# Patient Record
Sex: MALE | Race: WHITE | NOT HISPANIC OR LATINO | Employment: OTHER | URBAN - METROPOLITAN AREA
[De-identification: names, ages, dates, MRNs, and addresses within clinical notes are randomized per-mention and may not be internally consistent; named-entity substitution may affect disease eponyms.]

---

## 2017-08-03 ENCOUNTER — TELEPHONE (OUTPATIENT)
Dept: MEDICAL GROUP | Facility: MEDICAL CENTER | Age: 72
End: 2017-08-03

## 2017-08-03 DIAGNOSIS — E78.5 OTHER AND UNSPECIFIED HYPERLIPIDEMIA: ICD-10-CM

## 2017-08-03 NOTE — TELEPHONE ENCOUNTER
1. Caller Name: Mahendra Michele Sutton                                           Call Back Number: [phone]        Patient approves a detailed voicemail message: N\A    Patient wanted to know if you would place labs for his up coming appointment with you on 8/11? Please advise

## 2017-08-09 ENCOUNTER — HOSPITAL ENCOUNTER (OUTPATIENT)
Dept: LAB | Facility: MEDICAL CENTER | Age: 72
End: 2017-08-09
Attending: INTERNAL MEDICINE
Payer: MEDICARE

## 2017-08-09 DIAGNOSIS — E78.5 OTHER AND UNSPECIFIED HYPERLIPIDEMIA: ICD-10-CM

## 2017-08-09 LAB
CHOLEST SERPL-MCNC: 171 MG/DL (ref 100–199)
HDLC SERPL-MCNC: 48 MG/DL
LDLC SERPL CALC-MCNC: 104 MG/DL
TRIGL SERPL-MCNC: 93 MG/DL (ref 0–149)

## 2017-08-09 PROCEDURE — 80061 LIPID PANEL: CPT

## 2017-08-09 PROCEDURE — 36415 COLL VENOUS BLD VENIPUNCTURE: CPT

## 2017-08-11 ENCOUNTER — OFFICE VISIT (OUTPATIENT)
Dept: MEDICAL GROUP | Facility: MEDICAL CENTER | Age: 72
End: 2017-08-11
Payer: MEDICARE

## 2017-08-11 VITALS
BODY MASS INDEX: 28.97 KG/M2 | TEMPERATURE: 98.7 F | DIASTOLIC BLOOD PRESSURE: 80 MMHG | WEIGHT: 191.14 LBS | HEIGHT: 68 IN | SYSTOLIC BLOOD PRESSURE: 110 MMHG | OXYGEN SATURATION: 94 % | HEART RATE: 69 BPM | RESPIRATION RATE: 14 BRPM

## 2017-08-11 DIAGNOSIS — E66.3 OVERWEIGHT (BMI 25.0-29.9): ICD-10-CM

## 2017-08-11 DIAGNOSIS — R25.1 TREMOR: ICD-10-CM

## 2017-08-11 DIAGNOSIS — E78.5 HYPERLIPIDEMIA, UNSPECIFIED HYPERLIPIDEMIA TYPE: ICD-10-CM

## 2017-08-11 DIAGNOSIS — R42 LIGHTHEADED: ICD-10-CM

## 2017-08-11 DIAGNOSIS — R60.0 LOCALIZED EDEMA: ICD-10-CM

## 2017-08-11 PROCEDURE — 99214 OFFICE O/P EST MOD 30 MIN: CPT | Performed by: INTERNAL MEDICINE

## 2017-08-11 RX ORDER — COVID-19 ANTIGEN TEST
KIT MISCELLANEOUS
COMMUNITY

## 2017-08-11 ASSESSMENT — PATIENT HEALTH QUESTIONNAIRE - PHQ9: CLINICAL INTERPRETATION OF PHQ2 SCORE: 0

## 2017-08-11 NOTE — ASSESSMENT & PLAN NOTE
He reports that especially when he stands up very quickly that he feels lightheaded for a very short period of time. Some days this is worse than others. He is not aware of aggravating or alleviating factors otherwise.

## 2017-08-11 NOTE — ASSESSMENT & PLAN NOTE
He remains overweight with BMI 29.06. Actually this is quite a bit better than he was before he had his gallbladder. He is not exercising it is not careful about his diet currently.

## 2017-08-11 NOTE — ASSESSMENT & PLAN NOTE
He has noticed a small amount of edema and especially his left lower leg. He is not careful about avoiding salt.

## 2017-08-11 NOTE — ASSESSMENT & PLAN NOTE
He has hyperlipidemia. Most recent lipid panel shows cholesterol 171, triglycerides 93, HDL 48, . He is not careful about his diet. He is overweight.

## 2017-08-11 NOTE — MR AVS SNAPSHOT
"        Mahendra Lewis   2017 1:40 PM   Office Visit   MRN: [id number]    Department:  [address] [place] [address]   Dept Phone:  [phone]    Description:  Male : 1945   Provider:  Perico Jones M.D.           Reason for Visit     Other is starting to notice symptoms of Parkinsons diease and wants to discuss.      Allergies as of 2017     Allergen Noted Reactions    Sulfa Drugs 2012       From childhood      You were diagnosed with     Lightheaded   [906120]       Tremor   [054867]         Vital Signs     Blood Pressure Pulse Temperature Respirations Height Weight    110/80 mmHg 69 37.1 °C (98.7 °F) 14 1.727 m (5' 8\") 86.7 kg (191 lb 2.2 oz)    Body Mass Index Oxygen Saturation Smoking Status             29.07 kg/m2 94% Never Smoker          Basic Information     Date Of Birth Sex Race Ethnicity Preferred Language    1945 Male White Non- English      Problem List              ICD-10-CM Priority Class Noted - Resolved    Other and unspecified hyperlipidemia E78.5   2015 - Present    Edema R60.9   2015 - Present      Health Maintenance        Date Due Completion Dates    IMM DTaP/Tdap/Td Vaccine (1 - Tdap) 1965 ---    COLONOSCOPY 1996 ---    IMM ZOSTER VACCINE 2006 ---    IMM PNEUMOCOCCAL 65+ (ADULT) LOW/MEDIUM RISK SERIES (1 of 2 - PCV13) 2011 ---    IMM INFLUENZA (1) 2017 ---            Current Immunizations     No immunizations on file.      Below and/or attached are the medications your provider expects you to take. Review all of your home medications and newly ordered medications with your provider and/or pharmacist. Follow medication instructions as directed by your provider and/or pharmacist. Please keep your medication list with you and share with your provider. Update the information when medications are discontinued, doses are changed, or new medications (including over-the-counter products) are added; and carry medication information at " all times in the event of emergency situations     Allergies:  SULFA DRUGS - (reactions not documented)               Medications  Valid as of: August 11, 2017 -  2:16 PM    Generic Name Brand Name Tablet Size Instructions for use    Metoprolol Tartrate (Tab) LOPRESSOR 25 MG Take 1 Tab by mouth 2 times a day.        Multiple Vitamins-Minerals (Tab) THERAGRAN-M  Take 1 Tab by mouth every day.        Naproxen Sodium (Cap) Naproxen Sodium 220 MG Take  by mouth.        non-formulary med non-formulary med  Take  by mouth every day. Indications: Herbal over counter testosteron        Omega-3 Fatty Acids (Cap) OMEGA 3 FA 1000 MG Take 1,000 mg by mouth every day.        .                 Medicines prescribed today were sent to:     [id number] - [place], [place] - [address] [address]    Phone: [phone] Fax: [phone]    Open 24 Hours?: No      Medication refill instructions:       If your prescription bottle indicates you have medication refills left, it is not necessary to call your provider’s office. Please contact your pharmacy and they will refill your medication.    If your prescription bottle indicates you do not have any refills left, you may request refills at any time through one of the following ways: The online [employer] system (except Urgent Care), by calling your provider’s office, or by asking your pharmacy to contact your provider’s office with a refill request. Medication refills are processed only during regular business hours and may not be available until the next business day. Your provider may request additional information or to have a follow-up visit with you prior to refilling your medication.   *Please Note: Medication refills are assigned a new Rx number when refilled electronically. Your pharmacy may indicate that no refills were authorized even though a new prescription for the same medication is available at the pharmacy. Please request the medicine  by name with the pharmacy before contacting your provider for a refill.           MyChart Access Code: Activation code not generated  Current MyChart Status: Active

## 2017-08-11 NOTE — ASSESSMENT & PLAN NOTE
He has noticed a tremor especially in his hands. He is not aware of aggravating or alleviating factors. He is not sure whether it occurs more with intention or at rest. He is not aware of any family history of idiopathic tremor or Parkinson's disease.

## 2017-08-12 ENCOUNTER — HOSPITAL ENCOUNTER (EMERGENCY)
Facility: MEDICAL CENTER | Age: 72
End: 2017-08-12
Attending: EMERGENCY MEDICINE
Payer: MEDICARE

## 2017-08-12 ENCOUNTER — APPOINTMENT (OUTPATIENT)
Dept: RADIOLOGY | Facility: MEDICAL CENTER | Age: 72
End: 2017-08-12
Attending: EMERGENCY MEDICINE
Payer: MEDICARE

## 2017-08-12 VITALS
HEIGHT: 68 IN | TEMPERATURE: 98 F | BODY MASS INDEX: 29.07 KG/M2 | HEART RATE: 63 BPM | RESPIRATION RATE: 16 BRPM | WEIGHT: 191.8 LBS | SYSTOLIC BLOOD PRESSURE: 139 MMHG | DIASTOLIC BLOOD PRESSURE: 85 MMHG | OXYGEN SATURATION: 98 %

## 2017-08-12 DIAGNOSIS — M25.512 ACUTE PAIN OF LEFT SHOULDER: ICD-10-CM

## 2017-08-12 PROCEDURE — 73030 X-RAY EXAM OF SHOULDER: CPT | Mod: LT

## 2017-08-12 PROCEDURE — A9270 NON-COVERED ITEM OR SERVICE: HCPCS | Performed by: EMERGENCY MEDICINE

## 2017-08-12 PROCEDURE — 700102 HCHG RX REV CODE 250 W/ 637 OVERRIDE(OP): Performed by: EMERGENCY MEDICINE

## 2017-08-12 PROCEDURE — 99284 EMERGENCY DEPT VISIT MOD MDM: CPT

## 2017-08-12 RX ORDER — HYDROCODONE BITARTRATE AND ACETAMINOPHEN 5; 325 MG/1; MG/1
1-2 TABLET ORAL EVERY 4 HOURS PRN
Qty: 20 TAB | Refills: 0 | Status: SHIPPED | OUTPATIENT
Start: 2017-08-12 | End: 2017-11-22

## 2017-08-12 RX ORDER — IBUPROFEN 600 MG/1
600 TABLET ORAL EVERY 8 HOURS PRN
Qty: 40 TAB | Refills: 0 | Status: SHIPPED | OUTPATIENT
Start: 2017-08-12 | End: 2017-11-22

## 2017-08-12 RX ORDER — HYDROCODONE BITARTRATE AND ACETAMINOPHEN 5; 325 MG/1; MG/1
1 TABLET ORAL ONCE
Status: COMPLETED | OUTPATIENT
Start: 2017-08-12 | End: 2017-08-12

## 2017-08-12 RX ORDER — IBUPROFEN 600 MG/1
600 TABLET ORAL ONCE
Status: COMPLETED | OUTPATIENT
Start: 2017-08-12 | End: 2017-08-12

## 2017-08-12 RX ADMIN — HYDROCODONE BITARTRATE AND ACETAMINOPHEN 1 TABLET: 5; 325 TABLET ORAL at 21:56

## 2017-08-12 RX ADMIN — IBUPROFEN 600 MG: 600 TABLET ORAL at 21:56

## 2017-08-12 ASSESSMENT — PAIN SCALES - GENERAL
PAINLEVEL_OUTOF10: 0
PAINLEVEL_OUTOF10: 6

## 2017-08-12 NOTE — ED AVS SNAPSHOT
Home Care Instructions                                                                                                                Mahendra Lewis   MRN: [id number]    Department:  [facility], Emergency Dept   Date of Visit:  8/12/2017            [facility], Emergency Dept    [address]    [place] [address]    Phone:  [phone]      You were seen by     Sabine Marquez M.D.      Your Diagnosis Was     Acute pain of left shoulder     M25.512       Follow-up Information     1. Call Jared Maravilla M.D..    Specialty:  Orthopaedics    Why:  Monday for further evaluation and treatment    Contact information    [address]  [address]  [place] [address]  [phone]        Medication Information     Review all of your home medications and newly ordered medications with your primary doctor and/or pharmacist as soon as possible. Follow medication instructions as directed by your doctor and/or pharmacist.     Please keep your complete medication list with you and share with your physician. Update the information when medications are discontinued, doses are changed, or new medications (including over-the-counter products) are added; and carry medication information at all times in the event of emergency situations.               Medication List      START taking these medications        Instructions    Morning Afternoon Evening Bedtime    hydrocodone-acetaminophen 5-325 MG Tabs per tablet   Commonly known as:  NORCO        Take 1-2 Tabs by mouth every four hours as needed.   Dose:  1-2 Tab                        ibuprofen 600 MG Tabs   Commonly known as:  MOTRIN        Take 1 Tab by mouth every 8 hours as needed for Moderate Pain.   Dose:  600 mg                          ASK your doctor about these medications        Instructions    Morning Afternoon Evening Bedtime    ALEVE 220 MG Caps   Generic drug:  Naproxen Sodium        Take  by mouth.                       metoprolol 25 MG Tabs   Commonly known as:  LOPRESSOR        Take 1 Tab by mouth 2 times a day.   Dose:  25 mg                        non-formulary med        Take  by mouth every day. Indications: Herbal over counter testosteron                        therapeutic multivitamin-minerals Tabs        Take 1 Tab by mouth every day.   Dose:  1 Tab                             Where to Get Your Medications      These medications were sent to [id number] - [place] [address] [place] [address]     Phone:  [phone]    - ibuprofen 600 MG Tabs      You can get these medications from any pharmacy     Bring a paper prescription for each of these medications    - hydrocodone-acetaminophen 5-325 MG Tabs per tablet            Procedures and tests performed during your visit     DX-SHOULDER 2+ LEFT    SLING        Discharge Instructions       Shoulder Pain  The shoulder is the joint that connects your arm to your body. Muscles and band-like tissues that connect bones to muscles (tendons) hold the joint together. Shoulder pain is felt if an injury or medical problem affects one or more parts of the shoulder.  HOME CARE   · Put ice on the sore area.  ¨ Put ice in a plastic bag.  ¨ Place a towel between your skin and the bag.  ¨ Leave the ice on for 15-20 minutes, 03-04 times a day for the first 2 days.  · Stop using cold packs if they do not help with the pain.  · If you were given something to keep your shoulder from moving (sling; shoulder immobilizer), wear it as told. Only take it off to shower or bathe.  · Move your arm as little as possible, but keep your hand moving to prevent puffiness (swelling).  · Squeeze a soft ball or foam pad as much as possible to help prevent swelling.  · Take medicine as told by your doctor.  GET HELP IF:  · You have progressing new pain in your arm, hand, or fingers.  · Your hand or fingers get cold.  · Your medicine does not help lessen  your pain.  GET HELP RIGHT AWAY IF:   · Your arm, hand, or fingers are numb or tingling.  · Your arm, hand, or fingers are puffy (swollen), painful, or turn white or blue.  MAKE SURE YOU:   · Understand these instructions.  · Will watch your condition.  · Will get help right away if you are not doing well or get worse.     This information is not intended to replace advice given to you by your health care provider. Make sure you discuss any questions you have with your health care provider.     Document Released: 06/05/2009 Document Revised: 01/08/2016 Document Reviewed: 04/11/2016  [employer] Interactive Patient Education ©2016 [employer]            Patient Information     Patient Information    Following emergency treatment: all patient requiring follow-up care must return either to a private physician or a clinic if your condition worsens before you are able to obtain further medical attention, please return to the emergency room.     Billing Information    At [facility], we work to make the billing process streamlined for our patients.  Our Representatives are here to answer any questions you may have regarding your hospital bill.  If you have insurance coverage and have supplied your insurance information to us, we will submit a claim to your insurer on your behalf.  Should you have any questions regarding your bill, we can be reached online or by phone as follows:  Online: You are able pay your bills online or live chat with our representatives about any billing questions you may have. We are here to help Monday - Friday from 8:00am to 7:30pm and 9:00am - 12:00pm on Saturdays.  Please visit [email or web address]  for more information.   Phone:  [phone] or [phone]    Please note that your emergency physician, surgeon, pathologist, radiologist, anesthesiologist, and other specialists are not employed by [facility] and will therefore bill separately for their services.   Please contact them directly for any questions concerning their bills at the numbers below:     Emergency Physician Services:  [phone]  [facility]:  [phone]  [facility]:  [phone]  [facility]:  [phone]    1. Your final bill may vary from the amount quoted upon discharge if all procedures are not complete at that time, or if your doctor has additional procedures of which we are not aware. You will receive an additional bill if you return to the Emergency Department at [facility] for suture removal regardless of the facility of which the sutures were placed.     2. Please arrange for settlement of this account at the emergency registration.    3. All self-pay accounts are due in full at the time of treatment.  If you are unable to meet this obligation then payment is expected within 4-5 days.     4. If you have had radiology studies (CT, X-ray, Ultrasound, MRI), you have received a preliminary result during your emergency department visit. Please contact the radiology department [phone] to receive a copy of your final result. Please discuss the Final result with your primary physician or with the follow up physician provided.     Crisis Hotline:  [place] Crisis Hotline:  1-414-SWSSRCI or 1-306.820.7324  [place] Crisis Hotline:    1-463.282.4782 or 792-691-0355         ED Discharge Follow Up Questions    1. In order to provide you with very good care, we would like to follow up with a phone call in the next few days.  May we have your permission to contact you?     YES /  NO    2. What is the best phone number to call you? (       )_____-__________    3. What is the best time to call you?      Morning  /  Afternoon  /  Evening                   Patient Signature:  ____________________________________________________________    Date:  ____________________________________________________________

## 2017-08-12 NOTE — ED AVS SNAPSHOT
8/12/2017    Mahendra Lewis  [address] [place] [address]    Dear Mahendra:    [facility] wants to ensure your discharge home is safe and you or your loved ones have had all of your questions answered regarding your care after you leave the hospital.    Below is a list of resources and contact information should you have any questions regarding your hospital stay, follow-up instructions, or active medical symptoms.    Questions or Concerns Regarding… Contact   Medical Questions Related to Your Discharge  (7 days a week, 8am-5pm) Contact a Nurse Care Coordinator   [phone]   Medical Questions Not Related to Your Discharge  (24 hours a day / 7 days a week)  Contact the Nurse Health Line   [phone]    Medications or Discharge Instructions Refer to your discharge packet   or contact your [facility] Primary Care Provider   [phone]   Follow-up Appointment(s) Schedule your appointment via Sc[employer]   or contact Scheduling [phone]   Billing Review your statement via [employer]  or contact Billing [phone]   Medical Records Review your records via [employer]   or contact Medical Records [phone]     You may receive a telephone call within two days of discharge. This call is to make certain you understand your discharge instructions and have the opportunity to have any questions answered. You can also easily access your medical information, test results and upcoming appointments via the [employer]oopshot free online health management tool. You can learn more and sign up at [email or web address]. For assistance setting up your Sc[employer]shot account, please call [phone].    Once again, we want to ensure your discharge home is safe and that you have a clear understanding of any next steps in your care. If you have any questions or concerns, please do not hesitate to contact us, we are here for you. Thank you for choosing [facility] for your healthcare needs.    Sincerely,    Your [facility] Healthcare Team

## 2017-08-12 NOTE — PROGRESS NOTES
Subjective:     Chief Complaint   Patient presents with   • Other     is starting to notice symptoms of Parkinsons diease and wants to discuss.     Mahendra Lewis is a 71 y.o. male here today for follow-up of his various health problems and evaluation of a tremor.    Edema  He has noticed a small amount of edema and especially his left lower leg. He is not careful about avoiding salt.    Hyperlipidemia  He has hyperlipidemia. Most recent lipid panel shows cholesterol 171, triglycerides 93, HDL 48, . He is not careful about his diet. He is overweight.    Lightheaded  He reports that especially when he stands up very quickly that he feels lightheaded for a very short period of time. Some days this is worse than others. He is not aware of aggravating or alleviating factors otherwise.    Tremor  He has noticed a tremor especially in his hands. He is not aware of aggravating or alleviating factors. He is not sure whether it occurs more with intention or at rest. He is not aware of any family history of idiopathic tremor or Parkinson's disease.    Overweight (BMI 25.0-29.9)  He remains overweight with BMI 29.06. Actually this is quite a bit better than he was before he had his gallbladder out. He is not exercising it is not careful about his diet currently.       Diagnoses of Lightheaded, Tremor, Hyperlipidemia, unspecified hyperlipidemia type, Localized edema, and Overweight (BMI 25.0-29.9) were pertinent to this visit.    Allergies: Sulfa drugs  Current medicines (including changes today)  Current Outpatient Prescriptions   Medication Sig Dispense Refill   • Naproxen Sodium (ALEVE) 220 MG Cap Take  by mouth.     • metoprolol (LOPRESSOR) 25 MG Tab Take 1 Tab by mouth 2 times a day. 60 Tab 11   • therapeutic multivitamin-minerals (THERAGRAN-M) TABS Take 1 Tab by mouth every day.     • non-formulary med Take  by mouth every day. Indications: Herbal over counter testosteron     • docosahexanoic acid (OMEGA 3 FA)  "1000 MG CAPS Take 1,000 mg by mouth every day.       No current facility-administered medications for this visit.       He  has a past medical history of Cancer (CMS-[facility]); Dental disorder; Other and unspecified hyperlipidemia (7/8/2015); Edema (7/8/2015); and Overweight (BMI 25.0-29.9) (8/11/2017).  Health Maintenance: He will get more complete wellness evaluation in the near future.  ROS    Patient denies significant change in strength, weight or appetite.  No significant lightheadedness or headaches.  No change in vision, hearing, or swallowing.  No new dyspnea, coughing, chest pain, or palpitations.  No indigestion, abdominal pain, or change in bowel habits.  No change in urinating.  No new ankle swelling.       Objective:     PE:  /80 mmHg  Pulse 69  Temp(Src) 37.1 °C (98.7 °F)  Resp 14  Ht 1.727 m (5' 8\")  Wt 86.7 kg (191 lb 2.2 oz)  BMI 29.07 kg/m2  SpO2 94%   Neck is supple without significant lymphadenopathy or masses.  Lungs are clear with normal breath sounds without wheezes or rales .  Cardiovascular: peripheral circulation is satisfactory, heart sounds are unchanged and unremarkable.  Abdomen is soft, without masses or tenderness, with normal bowel sounds.  Extremities are without significant edema, cyanosis or deformity except for trace to 1+ ankle edema bilaterally.  No significant tremor today. Perhaps very minimal cogwheel rigidity. Good facial expression.      Assessment and Plan:   The following treatment plan was discussed  1. Lightheaded      He was cautioned against falling. He is encouraged to remain well hydrated.   2. Tremor      He will pay attention to aggravating and alleviating factors and when this tends to occur.   3. Hyperlipidemia, unspecified hyperlipidemia type.      Long discussion regarding appropriate diet. Continue weight loss. Exercise.   4. Localized edema      Avoid salt, elevate legs when sitting.   5. Overweight (BMI 25.0-29.9)      Continue working at weight " loss with appropriate diet and exercise.       Followup: Return in about 4 weeks (around 9/8/2017) for Long.

## 2017-08-12 NOTE — ED AVS SNAPSHOT
KitOrder Access Code: Activation code not generated  Current KitOrder Status: Active    [employer]  A secure, online tool to manage your health information     [employer] KitOrder® is a secure, online tool that connects you to your personalized health information from the privacy of your home -- day or night - making it very easy for you to manage your healthcare. Once the activation process is completed, you can even access your medical information using the KitOrder sammy, which is available for free in the Apple Sammy store or Google Play store.     KitOrder provides the following levels of access (as shown below):   My Chart Features   [facility] Primary Care Doctor [facility]  Specialists [facility]  Urgent  Care Non-[facility]  Primary Care  Doctor   Email your healthcare team securely and privately 24/7 X X X X   Manage appointments: schedule your next appointment; view details of past/upcoming appointments X      Request prescription refills. X      View recent personal medical records, including lab and immunizations X X X X   View health record, including health history, allergies, medications X X X X   Read reports about your outpatient visits, procedures, consult and ER notes X X X X   See your discharge summary, which is a recap of your hospital and/or ER visit that includes your diagnosis, lab results, and care plan. X X       How to register for KitOrder:  1. Go to  [email or web address].  2. Click on the Sign Up Now box, which takes you to the New Member Sign Up page. You will need to provide the following information:  a. Enter your KitOrder Access Code exactly as it appears at the top of this page. (You will not need to use this code after you’ve completed the sign-up process. If you do not sign up before the expiration date, you must request a new code.)   b. Enter your date of birth.   c. Enter your home email address.   d. Click Submit, and follow the next screen’s instructions.  3. Create a KitOrder ID. This will  be your Rawbots login ID and cannot be changed, so think of one that is secure and easy to remember.  4. Create a [employer]bots password. You can change your password at any time.  5. Enter your Password Reset Question and Answer. This can be used at a later time if you forget your password.   6. Enter your e-mail address. This allows you to receive e-mail notifications when new information is available in Rawbots.  7. Click Sign Up. You can now view your health information.    For assistance activating your [employer]bots account, call [phone]

## 2017-08-13 NOTE — ED NOTES
Discharge information provided. Pt verbalized understanding of discharge instructions to follow up with Ortho and to return to ER if condition worsens. Pt expressed the awareness of not driving or operating heavy machinery, has ride home with Daughter. Pt ambulated out of ER in a steady gait, no additional questions or concerns. Sling in place, paper scripts given, educated on new medications.

## 2017-08-13 NOTE — DISCHARGE INSTRUCTIONS
Shoulder Pain  The shoulder is the joint that connects your arm to your body. Muscles and band-like tissues that connect bones to muscles (tendons) hold the joint together. Shoulder pain is felt if an injury or medical problem affects one or more parts of the shoulder.  HOME CARE   · Put ice on the sore area.  ¨ Put ice in a plastic bag.  ¨ Place a towel between your skin and the bag.  ¨ Leave the ice on for 15-20 minutes, 03-04 times a day for the first 2 days.  · Stop using cold packs if they do not help with the pain.  · If you were given something to keep your shoulder from moving (sling; shoulder immobilizer), wear it as told. Only take it off to shower or bathe.  · Move your arm as little as possible, but keep your hand moving to prevent puffiness (swelling).  · Squeeze a soft ball or foam pad as much as possible to help prevent swelling.  · Take medicine as told by your doctor.  GET HELP IF:  · You have progressing new pain in your arm, hand, or fingers.  · Your hand or fingers get cold.  · Your medicine does not help lessen your pain.  GET HELP RIGHT AWAY IF:   · Your arm, hand, or fingers are numb or tingling.  · Your arm, hand, or fingers are puffy (swollen), painful, or turn white or blue.  MAKE SURE YOU:   · Understand these instructions.  · Will watch your condition.  · Will get help right away if you are not doing well or get worse.     This information is not intended to replace advice given to you by your health care provider. Make sure you discuss any questions you have with your health care provider.     Document Released: 06/05/2009 Document Revised: 01/08/2016 Document Reviewed: 04/11/2016  [employer] Patient Education ©2016 [employer]

## 2017-08-13 NOTE — ED NOTES
Pt injured shoulder at 1800 while moving tonight. Severe pain when lifting arm upwards, CMS intact. Call light within reach, will cont to monitor.

## 2017-08-13 NOTE — ED PROVIDER NOTES
ED Provider Note    CHIEF COMPLAINT  Chief Complaint   Patient presents with   • Shoulder Pain       HPI  Mahendra Lewis is a 71 y.o. male who presents after trying to place a very heavy and we'll be bagged closed on the floor with his nondominant left arm, he felt a painful pop in the front of his left shoulder. He did not actually fall     REVIEW OF SYSTEMS  See HPI for further details.     PAST MEDICAL HISTORY  Past Medical History   Diagnosis Date   • Cancer (CMS-HCC)      squamous cell on skin   • Dental disorder      dental implants   • Other and unspecified hyperlipidemia 7/8/2015   • Edema 7/8/2015   • Overweight (BMI 25.0-29.9) 8/11/2017       FAMILY HISTORY  Family History   Problem Relation Age of Onset   • Heart Attack Father    • Cancer Brother      brain tumor       SOCIAL HISTORY  Social History     Social History   • Marital Status:      Spouse Name: N/A   • Number of Children: N/A   • Years of Education: N/A     Social History Main Topics   • Smoking status: Never Smoker    • Smokeless tobacco: Never Used   • Alcohol Use: Yes      Comment: Rarely   • Drug Use: No   • Sexual Activity: Not Asked     Other Topics Concern   • None     Social History Narrative       SURGICAL HISTORY  Past Surgical History   Procedure Laterality Date   • Hernia repair     • Tonsillectomy     • Other       squamous cell ca removed   • Janet by laparoscopy  1/20/2012     Performed by MARAH TORRES at [facility]       CURRENT MEDICATIONS  Home Medications     Reviewed by Carlos Ferro R.N. (Registered Nurse) on 08/12/17 at 1932  Med List Status: Complete    Medication Last Dose Status    metoprolol (LOPRESSOR) 25 MG Tab Not taking Active    Naproxen Sodium (ALEVE) 220 MG Cap  Active    non-formulary med  Active    therapeutic multivitamin-minerals (THERAGRAN-M) TABS 8/12/2017 Active                ALLERGIES  Allergies   Allergen Reactions   • Sulfa Drugs      From childhood       PHYSICAL  "EXAM  VITAL SIGNS: /90 mmHg  Pulse 73  Temp(Src) 36.6 °C (97.8 °F)  Resp 20  Ht 1.727 m (5' 7.99\")  Wt 87 kg (191 lb 12.8 oz)  BMI 29.17 kg/m2  SpO2 98% @BRANT[200176::@   Constitutional: Well developed, Well nourished, No acute respiratory distress, Non-toxic appearance.   HENT: Normocephalic, Atraumatic, Bilateral external ears normal, Oropharynx clear, mucous membranes are moist.  Eyes: Conjunctiva normal, No discharge. No icterus.  Neck: Normal range of motion. Supple.  Skin: Warm, Dry, No erythema, No rash. Intact. Multiple tattoos   Musculoskeletal: No specific sent tenderness to the left shoulder girdle. Mild tenderness over the long biceps tendon. Patient has good strength with humerus extension, abduction, external rotation, but he has pain and reflexive drop attempting forward flexion and internal rotation as well as elbow flexion.  Neurologic: Alert & oriented x 3. No focal deficits noted.  Sensation is intact left deltoid and hand  Cardiac 2+ left radial pulse    DX-SHOULDER 2+ LEFT   Final Result      1.  No radiographic evidence of acute traumatic injury.   2.  Degenerative change of the acromioclavicular joint.              COURSE & MEDICAL DECISION MAKING  Pertinent Labs & Imaging studies reviewed. (See chart for details)  Based on the patient's presentation, he may have avulsed the short head of his biceps or subscapularis. He is put into a sling, and he was given Motrin and Norco here in the emergency department. He is comfortable following up with orthopedics next week.    I reviewed prescription monitoring program for patient's narcotic use before prescribing a scheduled drug.The patient will not drink alcohol nor drive with prescribed medications. The patient will return for new or worsening symptoms and is stable at the time of discharge.        DISPOSITION:  Patient will be discharged home in stable condition.    FOLLOW UP:  Jared Maravilla M.D.  [address]  [address]  [place] [address]  [phone]    Call  Monday for further evaluation and treatment      OUTPATIENT MEDICATIONS:  New Prescriptions    HYDROCODONE-ACETAMINOPHEN (NORCO) 5-325 MG TAB PER TABLET    Take 1-2 Tabs by mouth every four hours as needed.    IBUPROFEN (MOTRIN) 600 MG TAB    Take 1 Tab by mouth every 8 hours as needed for Moderate Pain.           FINAL IMPRESSION  1. Acute pain of left shoulder               Electronically signed by: Sabine Marquez, 8/12/2017 9:27 PM

## 2017-11-22 ENCOUNTER — APPOINTMENT (OUTPATIENT)
Dept: ADMISSIONS | Facility: MEDICAL CENTER | Age: 72
End: 2017-11-22
Attending: ORTHOPAEDIC SURGERY
Payer: MEDICARE

## 2017-11-30 ENCOUNTER — HOSPITAL ENCOUNTER (OUTPATIENT)
Facility: MEDICAL CENTER | Age: 72
End: 2017-11-30
Attending: ORTHOPAEDIC SURGERY | Admitting: ORTHOPAEDIC SURGERY
Payer: MEDICARE

## 2017-11-30 VITALS
SYSTOLIC BLOOD PRESSURE: 113 MMHG | RESPIRATION RATE: 14 BRPM | DIASTOLIC BLOOD PRESSURE: 93 MMHG | HEART RATE: 64 BPM | BODY MASS INDEX: 28.73 KG/M2 | HEIGHT: 69 IN | TEMPERATURE: 96.8 F | WEIGHT: 194 LBS | OXYGEN SATURATION: 93 %

## 2017-11-30 PROCEDURE — 500028 HCHG ARTHROWAND TURBOVAC 3.5/90 SUCT.: Performed by: ORTHOPAEDIC SURGERY

## 2017-11-30 PROCEDURE — 501835 HCHG SUTURE PLASTIC: Performed by: ORTHOPAEDIC SURGERY

## 2017-11-30 PROCEDURE — 700101 HCHG RX REV CODE 250

## 2017-11-30 PROCEDURE — 700111 HCHG RX REV CODE 636 W/ 250 OVERRIDE (IP)

## 2017-11-30 PROCEDURE — 160029 HCHG SURGERY MINUTES - 1ST 30 MINS LEVEL 4: Performed by: ORTHOPAEDIC SURGERY

## 2017-11-30 PROCEDURE — 160036 HCHG PACU - EA ADDL 30 MINS PHASE I: Performed by: ORTHOPAEDIC SURGERY

## 2017-11-30 PROCEDURE — 502581 HCHG PACK, SHOULDER ARTHROSCOPY: Performed by: ORTHOPAEDIC SURGERY

## 2017-11-30 PROCEDURE — 160041 HCHG SURGERY MINUTES - EA ADDL 1 MIN LEVEL 4: Performed by: ORTHOPAEDIC SURGERY

## 2017-11-30 PROCEDURE — 160047 HCHG PACU  - EA ADDL 30 MINS PHASE II: Performed by: ORTHOPAEDIC SURGERY

## 2017-11-30 PROCEDURE — 160022 HCHG BLOCK: Performed by: ORTHOPAEDIC SURGERY

## 2017-11-30 PROCEDURE — 160048 HCHG OR STATISTICAL LEVEL 1-5: Performed by: ORTHOPAEDIC SURGERY

## 2017-11-30 PROCEDURE — 160002 HCHG RECOVERY MINUTES (STAT): Performed by: ORTHOPAEDIC SURGERY

## 2017-11-30 PROCEDURE — 160046 HCHG PACU - 1ST 60 MINS PHASE II: Performed by: ORTHOPAEDIC SURGERY

## 2017-11-30 PROCEDURE — 160009 HCHG ANES TIME/MIN: Performed by: ORTHOPAEDIC SURGERY

## 2017-11-30 PROCEDURE — 160035 HCHG PACU - 1ST 60 MINS PHASE I: Performed by: ORTHOPAEDIC SURGERY

## 2017-11-30 PROCEDURE — 160025 RECOVERY II MINUTES (STATS): Performed by: ORTHOPAEDIC SURGERY

## 2017-11-30 PROCEDURE — 500151 HCHG CANNULA, THRDED 8.4: Performed by: ORTHOPAEDIC SURGERY

## 2017-11-30 PROCEDURE — C1713 ANCHOR/SCREW BN/BN,TIS/BN: HCPCS | Performed by: ORTHOPAEDIC SURGERY

## 2017-11-30 DEVICE — SUTURE ANCHOR HEALICOIL REGENESORB 5.5MM: Type: IMPLANTABLE DEVICE | Status: FUNCTIONAL

## 2017-11-30 RX ORDER — LIDOCAINE HYDROCHLORIDE 10 MG/ML
INJECTION, SOLUTION INFILTRATION; PERINEURAL
Status: COMPLETED
Start: 2017-11-30 | End: 2017-11-30

## 2017-11-30 RX ORDER — LIDOCAINE HYDROCHLORIDE AND EPINEPHRINE 10; 10 MG/ML; UG/ML
INJECTION, SOLUTION INFILTRATION; PERINEURAL
Status: DISCONTINUED | OUTPATIENT
Start: 2017-11-30 | End: 2017-11-30 | Stop reason: HOSPADM

## 2017-11-30 RX ORDER — SODIUM CHLORIDE, SODIUM LACTATE, POTASSIUM CHLORIDE, CALCIUM CHLORIDE 600; 310; 30; 20 MG/100ML; MG/100ML; MG/100ML; MG/100ML
1000 INJECTION, SOLUTION INTRAVENOUS
Status: COMPLETED | OUTPATIENT
Start: 2017-11-30 | End: 2017-11-30

## 2017-11-30 RX ADMIN — LIDOCAINE HYDROCHLORIDE 0.2 ML: 10 INJECTION, SOLUTION INFILTRATION; PERINEURAL at 08:22

## 2017-11-30 RX ADMIN — SODIUM CHLORIDE, SODIUM LACTATE, POTASSIUM CHLORIDE, CALCIUM CHLORIDE 1000 ML: 600; 310; 30; 20 INJECTION, SOLUTION INTRAVENOUS at 08:22

## 2017-11-30 ASSESSMENT — PAIN SCALES - GENERAL
PAINLEVEL_OUTOF10: 0

## 2017-11-30 NOTE — OR NURSING
1022 To PACU from OR on [facility]. All bedside rails up for safe transfer. Sleeping with ET tube and opa. Even non labored breathing. Skin pink warm dry. LUE dressing cdi. Immobilizer, brisk cap refill, 2+ radial pulse, pink arm, ice pack. Lungs clear bilaterally.   1034 no changes.   1045 ET tube and opa removed. Spontaneous even non labored breathing. arousable and calling. Denies pain and nausea.   1102 given incentive spirometer. 1000 noted.   1105 awake, denies pain and nausea.   1115 awake and alert. Denies pain and nausea.   1122 report given to Jojo huerta.

## 2017-11-30 NOTE — OR NURSING
0755 Pt. Allergies and NPO status verified, home medications reconciled. Belongings secured. Pt. Verbalizes understanding of pain scale, expected course of stay and plan of care. Surgical site verified with pt. Pt. And family given home care instructions for discharge planning. IV access established. Sequentials placed on legs.

## 2017-11-30 NOTE — DISCHARGE INSTRUCTIONS
ACTIVITY: Rest and take it easy for the first 24 hours.  A responsible adult is recommended to remain with you during that time.  It is normal to feel sleepy.  We encourage you to not do anything that requires balance, judgment or coordination.    MILD FLU-LIKE SYMPTOMS ARE NORMAL. YOU MAY EXPERIENCE GENERALIZED MUSCLE ACHES, THROAT IRRITATION, HEADACHE AND/OR SOME NAUSEA.    FOR 24 HOURS DO NOT:  Drive, operate machinery or run household appliances.  Drink beer or alcoholic beverages.   Make important decisions or sign legal documents.    SPECIAL INSTRUCTIONS: printed instructions    DIET: To avoid nausea, slowly advance diet as tolerated, avoiding spicy or greasy foods for the first day.  Add more substantial food to your diet according to your physician's instructions.  Babies can be fed formula or breast milk as soon as they are hungry.  INCREASE FLUIDS AND FIBER TO AVOID CONSTIPATION.      FOLLOW-UP APPOINTMENT:  A follow-up appointment should be arranged with your doctor: call to schedule.    You should CALL YOUR PHYSICIAN if you develop:  Fever greater than 101 degrees F.  Pain not relieved by medication, or persistent nausea or vomiting.  Excessive bleeding (blood soaking through dressing) or unexpected drainage from the wound.  Extreme redness or swelling around the incision site, drainage of pus or foul smelling drainage.  Inability to urinate or empty your bladder within 8 hours.  Problems with breathing or chest pain.    You should call 911 if you develop problems with breathing or chest pain.If you are unable to contact your doctor or surgical center, you should go to the nearest emergency room or urgent care center.  Physician's telephone #: [phone]      If any questions arise, call your doctor.  If your doctor is not available, please feel free to call the Surgical Center at [phone].  The Center is open Monday through Friday from 7AM to 7PM.  You can also call the HEALTH HOTLINE open 08  hours/day, 7 days/week and speak to a nurse at [phone], or toll free at [phone].    A registered nurse may call you a few days after your surgery to see how you are doing after your procedure.    MEDICATIONS: Resume taking daily medication.  Take prescribed pain medication with food.  If no medication is prescribed, you may take non-aspirin pain medication if needed.  PAIN MEDICATION CAN BE VERY CONSTIPATING.  Take a stool softener or laxative such as senokot, pericolace, or milk of magnesia if needed.    Prescription given for preopertively.  Last pain medication given at n/a.    If your physician has prescribed pain medication that includes Acetaminophen (Tylenol), do not take additional Acetaminophen (Tylenol) while taking the prescribed medication.    Depression / Suicide Risk    As you are discharged from this [facility] facility, it is important to learn how to keep safe from harming yourself.    Recognize the warning signs:  · Abrupt changes in personality, positive or negative- including increase in energy   · Giving away possessions  · Change in eating patterns- significant weight changes-  positive or negative  · Change in sleeping patterns- unable to sleep or sleeping all the time   · Unwillingness or inability to communicate  · Depression  · Unusual sadness, discouragement and loneliness  · Talk of wanting to die  · Neglect of personal appearance   · Rebelliousness- reckless behavior  · Withdrawal from people/activities they love  · Confusion- inability to concentrate     If you or a loved one observes any of these behaviors or has concerns about self-harm, here's what you can do:  · Talk about it- your feelings and reasons for harming yourself  · Remove any means that you might use to hurt yourself (examples: pills, rope, extension cords, firearm)  · Get professional help from the community (Mental Health, Substance Abuse, psychological counseling)  · Do not be alone:Call your Safe  "Contact- someone whom you trust who will be there for you.  · Call your local CRISIS HOTLINE 558-4188 or 306-401-2744  · Call your local Children's Mobile Crisis Response Team [place] (567) 436-9213 or www.[employer]  · Call the toll free National Suicide Prevention Hotlines   · National Suicide Prevention Lifeline 537-141-GMRC (7218)  [employer] 800-SUICIDE (869-6903)    Peripheral Nerve Block Discharge Instructions from Same Day Surgery and Inpatient :    WWhat to Expect - Upper Extremity  · You may experience numbness and weakness in shoulder, arm and hand  on the same side as your surgery  · This is normal. For some people, this may be an unpleasant sensation. Be very careful with your numb limb  · Ask for help when you need it  Shoulder Surgery Side Effects  · In addition to numbness and weakness you may experience other symptoms  · Other nerves that are close to those nerves injected can also be affected by local anesthesia  · You may experience a hoarseness in your voice  · Your breathing may feel different  · You may also notice drooping of your eyelid, pupil constriction, and decreased sweating, on the side of your surgery  · All of these side effects are normal and will resolve when the local anesthetic wears off   Prevent Injury  · Protect the limb like a baby  · Beware of exposing your limb to extreme heat or cold or trauma  · The limb may be injured without you noticing because it is numb  · Keep the limb elevated whenever possible  · Do not sleep on the limb  · Change the position of the limb regularly  · Avoid putting pressure on your surgical limb  Pain Control  · The initial block on the day of surgery will make your extremity feel \"numb\"  · Any consecutive injection including prior to discharge from the hospital will make your extremity feel \"numb\"  · You may feel an aching or burning when the local anesthesia starts to wear off  · Take pain pills as prescribed by your " surgeon  · Call your surgeon or anesthesiologist if you do not have adequate pain control  ·

## 2017-11-30 NOTE — OR NURSING
1122 Assumed care of patient; denies pain or nausea. LUE in immobilizer with ice pack over CDI L shoulder dressing, HOB elevated at 45 degrees. +2 L radial pulse with pink/warm fingers and <3 sec cap refill. Monitoring oxygen for transfer to stage II.   1135 Instructed to DB/C; demonstrated understanding.   1150 No changes. Meets criteria for transfer to stage II.   1240 D/Gabriel to care of family post uneventful stay in PACU 2.

## 2017-11-30 NOTE — OP REPORT
DATE OF SERVICE:  11/30/2017    SURGEON:  West Arora MD    ASSISTANT:  Kathryn Amezquita PA-C    ANESTHESIA:  General anesthesia with single shot interscalene block.    PREOPERATIVE DIAGNOSES:  1.  Left shoulder full-thickness acute traumatic rotator cuff tear.  2.  Left shoulder subacromial impingement bursitis, left shoulder synovitis.  3.  Left shoulder labrum/superior labral anterior posterior tear.  4.  Left shoulder long head of the biceps tendon rupture.    POSTOPERATIVE DIAGNOSES:  1.  Left shoulder full-thickness acute traumatic rotator cuff tear.  2.  Left shoulder subacromial impingement bursitis, left shoulder synovitis.  3.  Left shoulder labrum/superior labral anterior posterior tear.  4.  Left shoulder long head of the biceps tendon rupture.    PROCEDURE PERFORMED:  1.  Left shoulder arthroscopy.  2.  Left shoulder labral debridement.  3.  Left shoulder synovectomy.  4.  Left shoulder subacromial decompression.  5.  Left shoulder large rotator cuff tear repair.    INDICATIONS:  Treat left shoulder injury, improve pain, improve function.    IMPLANTS:  Finney and Nephew 5.5 mm triple loaded Healicoil anchor x3.    HISTORY OF PRESENT ILLNESS:  The patient is a very pleasant and active   72-year-old male who experienced an injury to his left shoulder approximately   2 months ago.  He immediately had significant pain and weakness.  An MRI   demonstrated a large traumatic full-thickness rotator cuff tear.  As a result,   we discussed surgical intervention.  The patient has been n.p.o. since   midnight.  He is medically cleared for surgery by the anesthesia team.    INFORMED CONSENT:  The patient informed of the risks, benefits, and   alternatives to planned operation.  The risks include but not limited to   bleeding, infection, neurovascular damage, recurrent rotator cuff tear,   stiffness, osteoarthritis/cartilage damage, DVT, PE, MI, stroke, and death.    Advanced directives were reviewed.  After  answering all questions, the patient   elected to proceed with planned operation and an informed consent form was   signed.    DESCRIPTION OF PROCEDURE:  The patient was identified in the preoperative   holding area.  The correct procedural site was identified and marked.  The   patient was then brought to the operating room and transferred to the   operating room table.  He received a single shot interscalene block by the   anesthesia team followed by general anesthesia.  Examination of his left shoulder demonstrated an obvious jairon deformity of   the upper arm.  There were no overlying skin lesions, abrasions, or   lacerations.  Passive forward flexion, abduction was to 160.  With the arm at   the side, passive external rotation was to 45 degrees.  With the arm in 90   degrees of abduction, passive external rotation was at 90 degrees and passive   internal rotation was at 25 degrees.    The patient was then carefully placed in the beach chair position with all   bony prominences being well padded.  The left shoulder was then cleaned with   several alcohol-soaked gauzes and the subacromial space was injected with 30   mL of 1% lidocaine with epinephrine.  The left upper extremity was then   prepped and draped in normal standard sterile fashion.    A procedural pause was then performed with the operating room team.  The   procedure, the patient's identity, the operative side, surgical site and the   procedure were all verified.  The patient was given IV antibiotics prior to   incision.    We then began with a diagnostic arthroscopy via standard posterior and   anterior portals.  There was some diffuse synovitis at the glenohumeral joint.    There was some free edge tearing of the anterior and superior labrum, but no   significant tear posteriorly.  No obvious loose bodies or soft tissue debris   within the inferior axillary recess.  There was no evidence of the long head   of the biceps tendon within the joint  consistent with a previous rupture.    There was some intermittent grade II changes over the face of the glenoid and   humeral head, but no evidence of a full-thickness grade IV cartilage loss.    Subscapularis appeared to be intact as well as its insertion on the lesser   tuberosity.  Examination of the articular side of the rotator cuff   demonstrated a large full-thickness tear of the supraspinatus and the superior   fibers of the infraspinatus.  Examination of the subacromial space   demonstrated some extensive inflammation and synovitis of the subacromial   bursa.  There was a very prominent anterolateral acromial spur.  Examination   of the bursa of the rotator cuff once again demonstrated a large   full-thickness tear of the supraspinatus and superior fibers of the   infraspinatus.  The tear measured about 2.2 cm in the anterior/posterior   direction and was a crescent type shape.    I first turned my attention to the synovitis of the glenohumeral joint.  This   was debrided with the use of the oscillating suction shaver device and   electrocautery wand.  I then debrided some of the free edge tearing of the   anterior superior labrum.    I then removed the instruments from the joint.  The trocar and cannula were   placed back into the posterior portal into the subacromial space, followed by   the 30-degree scope.  Once in the subacromial space, and accessory portal off   the lateral aspect of the acromion was immediately created under arthroscopic   visualization.  A full bursectomy was then performed.  The undersurface of the   acromion was identified in the anterolateral acromioplasty was then performed   utilizing a cutting block technique.    I then turned my attention back to the rotator cuff.  I first debrided the   tuberosity of all remaining soft tissue.  I then utilized the arthroscopic bur   to create a healthy bed of bleeding bone.  I created a more anterolateral   portal and placed an 8.25 mm  cannula.  I then placed three 5.5 mm triple   loaded Healicoil Finney and Nephew anchors onto the medial aspect of the   greater tuberosity immediately adjacent to the articular cartilage.  The   sutures were then passed through the rotator cuff immediately lateral to the   myotendinous junction.  I then utilized a microfracture awl to create several   holes within the middle and lateral aspects of the tuberosity in hopes of   improving biological healing.  The sutures were then tied resulting in   excellent fixation of the rotator cuff back to the greater tuberosity.  The   oscillating suction shaver device was then placed back in the glenohumeral   joint subacromial spaces to remove any potential soft tissue or bony debris.    Meticulous hemostasis was obtained.  All instruments were then removed.    All incisions were then copiously irrigated and closed with simple 3-0 Prolene   suture followed by Steri-Strips.  Xeroform was placed over all incisions   followed by sterile compressive dressing, and the patient was then placed in a   well-padded abduction sling.    Needle and sponge counts were correct at the end of the procedure as reported   to the surgeon by the circulating nurse.  The patient tolerated the procedure   well with no obvious intraoperative complications.  The patient was then   transferred off the operating room onto the regular hospital bed and was   extubated by the anesthesia team.    ESTIMATED BLOOD LOSS:  5 mL.    COMPLICATIONS:  None.    TOURNIQUET TIME:  None.    SPECIMENS:  None.    WOUND TYPE:  Type 1 clean.    POSTOPERATIVE PLAN:  The patient will be transferred back to the postoperative   unit.  I expect he will be discharged from the hospital later this morning   once mobilizing safely and tolerating oral medications.  The patient will   remain nonweightbearing with the left upper extremity for the next 6 weeks.    We will begin physical therapy at that time.  No indication for    pharmacological DVT prophylaxis.       ____________________________________     MD JAIME Lui / BASIA    DD:  11/30/2017 10:15:48  DT:  11/30/2017 12:48:43    D#:  [id number]  Job#:  [id number]

## 2018-05-24 ENCOUNTER — PATIENT OUTREACH (OUTPATIENT)
Dept: HEALTH INFORMATION MANAGEMENT | Facility: OTHER | Age: 73
End: 2018-05-24

## 2018-05-24 NOTE — PROGRESS NOTES
Outcome: VM Full    Please transfer to Patient Outreach Team at [phone] when patient returns call.    WebIZ Checked & Epic Updated:  yes    [facility] Verified: yes    Attempt # 1

## 2018-10-17 ENCOUNTER — PATIENT OUTREACH (OUTPATIENT)
Dept: HEALTH INFORMATION MANAGEMENT | Facility: OTHER | Age: 73
End: 2018-10-17

## 2018-11-01 NOTE — PROGRESS NOTES
Outcome: no answer/ no voicemail    Please transfer to Patient Outreach Team at [phone] when patient returns call.      Attempt # 2

## 2018-11-12 NOTE — PROGRESS NOTES
Outcome: No Answer and No Message Left    Please transfer to Patient Outreach Team at [phone] when patient returns call.    Attempt # 4

## 2018-12-31 ENCOUNTER — PATIENT OUTREACH (OUTPATIENT)
Dept: HEALTH INFORMATION MANAGEMENT | Facility: OTHER | Age: 73
End: 2018-12-31

## 2018-12-31 NOTE — PROGRESS NOTES
Outcome: no answer no VM  Please transfer to Patient Outreach Team at [phone] when patient returns call.    WebIZ Checked & Epic Updated:  yes    [facility] Verified: yes    Attempt # 1

## 2019-01-07 NOTE — PROGRESS NOTES
Outcome: no answer no VM    WebIZ Checked & Epic Updated: not found    [facility] Verified: yes    Attempt # 2

## 2019-08-09 ENCOUNTER — PATIENT OUTREACH (OUTPATIENT)
Dept: HEALTH INFORMATION MANAGEMENT | Facility: OTHER | Age: 74
End: 2019-08-09

## 2019-09-30 ENCOUNTER — TELEPHONE (OUTPATIENT)
Dept: MEDICAL GROUP | Facility: MEDICAL CENTER | Age: 74
End: 2019-09-30

## 2019-09-30 NOTE — TELEPHONE ENCOUNTER
Colorectal Care Gap Outreach    1. Confirmed patient is between the ages of 50-75: YES     2. Confirmed that patient IS overdue or due soon for colorectal cancer screening: YES     3. Were orders placed within the last 12 months to complete screening: NO     Phone Number Called: There are no phone numbers on file.      Call outcome: Patient plans to schedule appointment He is currently traveling & will call back to make an appointment and go over Care Gaps    _____________________________________________________________________    Colon Cancer Screening Guidelines:     Important: If patient has any history of colon polyps or family history of colorectal cancer, FIT and Cologuard are NOT appropriate options. A colonoscopy is the recommended test for this set of patients.    • Colonoscopy  o Always recommend colonoscopy first.   o A colonoscopy is recommended over the other tests because it provides direct visualization of the colon and if there are small polyps these can also be removed with one procedure.  o If negative and no family history, could be cleared for 10 years.     • Cologuard/FIT  o Cologuard is completed once every 3 years.  o FIT is completed annually.  o If positive, Cologuard and FIT will require a diagnostic colonoscopy. Screening colonoscopies are classically covered by insurances, however, diagnostic colonoscopies may result in a bill.

## 2021-01-15 DIAGNOSIS — Z23 NEED FOR VACCINATION: ICD-10-CM

## 2021-02-26 DIAGNOSIS — R22.30 SHOULDER MASS: ICD-10-CM

## 2021-02-26 NOTE — PROGRESS NOTES
Patient has a mass apparently along the proximal humerus that was seen by Dr. Nino who has recommended that it be biopsied by Dr. Serna.  I sent in a referral to Dr. Serna

## 2021-03-23 ENCOUNTER — OFFICE VISIT (OUTPATIENT)
Dept: SURGICAL ONCOLOGY | Facility: MEDICAL CENTER | Age: 76
End: 2021-03-23
Payer: MEDICARE

## 2021-03-23 VITALS
SYSTOLIC BLOOD PRESSURE: 128 MMHG | WEIGHT: 182 LBS | DIASTOLIC BLOOD PRESSURE: 72 MMHG | OXYGEN SATURATION: 95 % | BODY MASS INDEX: 27.58 KG/M2 | HEART RATE: 72 BPM | HEIGHT: 68 IN | TEMPERATURE: 97.1 F

## 2021-03-23 DIAGNOSIS — M79.603 PAIN OF UPPER EXTREMITY, UNSPECIFIED LATERALITY: ICD-10-CM

## 2021-03-23 DIAGNOSIS — R22.31 LOCALIZED SWELLING, MASS, OR LUMP OF UPPER EXTREMITY, RIGHT: ICD-10-CM

## 2021-03-23 PROCEDURE — 99205 OFFICE O/P NEW HI 60 MIN: CPT | Performed by: SURGERY

## 2021-03-31 ENCOUNTER — HOSPITAL ENCOUNTER (OUTPATIENT)
Facility: MEDICAL CENTER | Age: 76
End: 2021-03-31
Attending: SURGERY | Admitting: SURGERY
Payer: MEDICARE

## 2021-04-01 ENCOUNTER — TELEPHONE (OUTPATIENT)
Dept: SURGICAL ONCOLOGY | Facility: MEDICAL CENTER | Age: 76
End: 2021-04-01

## 2021-04-01 NOTE — TELEPHONE ENCOUNTER
I have left patient several message and now his VM is full. I also called his daughter Aruna and let her know I've been trying to reach him to get him scheduled for a procedure.

## 2021-05-20 NOTE — PROGRESS NOTES
Outcome: no answer / no voicemail     Please transfer to Patient Outreach Team at [phone] when patient returns call.      Attempt # 3         Home Care Agency/[facility]

## 2021-08-02 ENCOUNTER — TELEPHONE (OUTPATIENT)
Dept: SCHEDULING | Facility: IMAGING CENTER | Age: 76
End: 2021-08-02

## 2021-08-03 ENCOUNTER — OFFICE VISIT (OUTPATIENT)
Dept: MEDICAL GROUP | Facility: IMAGING CENTER | Age: 76
End: 2021-08-03
Payer: MEDICARE

## 2021-08-03 VITALS
SYSTOLIC BLOOD PRESSURE: 118 MMHG | DIASTOLIC BLOOD PRESSURE: 74 MMHG | WEIGHT: 186 LBS | TEMPERATURE: 98.9 F | OXYGEN SATURATION: 97 % | HEIGHT: 68 IN | BODY MASS INDEX: 28.19 KG/M2 | HEART RATE: 68 BPM

## 2021-08-03 DIAGNOSIS — Z02.89 ENCOUNTER FOR COMPLETION OF FORM WITH PATIENT: ICD-10-CM

## 2021-08-03 DIAGNOSIS — Z76.89 ENCOUNTER TO ESTABLISH CARE: ICD-10-CM

## 2021-08-03 PROCEDURE — 99202 OFFICE O/P NEW SF 15 MIN: CPT | Performed by: PHYSICIAN ASSISTANT

## 2021-08-03 ASSESSMENT — PAIN SCALES - GENERAL: PAINLEVEL: NO PAIN

## 2021-08-03 ASSESSMENT — PATIENT HEALTH QUESTIONNAIRE - PHQ9: CLINICAL INTERPRETATION OF PHQ2 SCORE: 0

## 2021-08-04 NOTE — PROGRESS NOTES
Subjective:     CC:    Chief Complaint   Patient presents with   • Establish Care   • Paperwork     [employer] license renewal       HISTORY OF THE PRESENT ILLNESS: Patient is a 75 y.o. male here for [employer] paperwork.  He sees Dr. Jones as his primary care provider and has been unable to get in with him for an annual physical.  He is overall feeling well.  No complaints.  No joint pain.  No vision changes.  He is here for [employer] 's license renewal.     Depression Screening    Little interest or pleasure in doing things?  0 - not at all   Feeling down, depressed , or hopeless? 0 - not at all     Allergies:   Sulfa drugs  Current Outpatient Medications Ordered in Epic   Medication Sig Dispense Refill   • Naproxen Sodium (ALEVE) 220 MG Cap Take  by mouth.       No current Epic-ordered facility-administered medications on file.     Past Medical History:   Diagnosis Date   • Cancer (HCC)     squamous cell on skin   • Dental disorder     dental implants   • Edema 7/8/2015   • Other and unspecified hyperlipidemia 7/8/2015   • Overweight (BMI 25.0-29.9) 8/11/2017     Past Surgical History:   Procedure Laterality Date   • SHOULDER DECOMPRESSION ARTHROSCOPIC Left 11/30/2017    Procedure: SHOULDER DECOMPRESSION ARTHROSCOPIC - SUBACROMIAL W/LABRAL DEBRIDEMENT;  Surgeon: West Arora M.D.;  Location: SUR[facility];  Service: Orthopedics   • SHOULDER ARTHROSCOPY W/ ROTATOR CUFF REPAIR Left 11/30/2017    Procedure: SHOULDER ARTHROSCOPY W/ ROTATOR CUFF REPAIR;  Surgeon: West Arora M.D.;  Location: [facility];  Service: Orthopedics   • TESHA BY LAPAROSCOPY  1/20/2012    Performed by MARAH TORRES at [facility]   • HERNIA REPAIR     • OTHER      squamous cell ca removed   • TONSILLECTOMY       Social History     Tobacco Use   • Smoking status: Never Smoker   • Smokeless tobacco: Never Used   Vaping Use   • Vaping Use: Never used   Substance Use Topics   • Alcohol use: Not  "Currently     Comment: Rarely   • Drug use: No     Social History     Social History Narrative   • Not on file     Family History   Problem Relation Age of Onset   • Heart Attack Father    • Cancer Brother         brain tumor     Health Maintenance:  Diet: balanced, no restrictions  Exercise: daily walking  Sunscreen: typically    Cancer screening  Colorectal Cancer Screening: Patient will discuss with Dr. Jones.  It has been over 10 years.  Prostate Cancer Screening/PSA: Last PSA 1.57 in 2014.    Infectious disease screening/Immunizations  --Immunizations: Due for Tdap, Shingrix, Pneumovax.  Patient will wait to discuss with Dr. Jones.     ROS:   Gen: no fevers/chills  Eyes: no changes in vision  Pulm: no sob, no cough  CV: no chest pain, no palpitations  : no dysuria or hematuria  MSk: no myalgias  Skin: no rash  Heme/Lymph: no easy bruising      Objective:     Exam: /74 (BP Location: Left arm, Patient Position: Sitting, BP Cuff Size: Adult)   Pulse 68   Temp 37.2 °C (98.9 °F) (Temporal)   Ht 1.727 m (5' 8\")   Wt 84.4 kg (186 lb)   SpO2 97%  Body mass index is 28.28 kg/m².  General: Normal appearing. No distress.  HEENT: Normocephalic. Eyes conjunctiva clear lids without ptosis, pupils equal.  Neck: Supple without JVD or bruit. Thyroid is not enlarged.  Pulmonary: Clear to ausculation.  Normal effort. No rales, ronchi, or wheezing.  Cardiovascular: Regular rate and rhythm without murmur. Carotid and radial pulses are intact and equal bilaterally.  Neurologic: Grossly nonfocal  Lymph: No cervical or supraclavicular lymph nodes are palpable  Skin: Warm and dry.  No obvious lesions.  Musculoskeletal: Normal gait. No extremity cyanosis, clubbing, or edema.  Psych: Normal mood and affect. Alert and oriented x3. Judgment and insight is normal.    Assessment & Plan:   75 y.o. male with the following -    1. Encounter to establish care  Pleasant 75 old male here for [employer] 's license renewal.  Patient's " chart reviewed and care gaps discussed.  Patient will schedule for annual physical with Dr. Jones later this year to update vaccinations, colonoscopy, PSA, routine labs.  Not on any daily medications.  Blood pressure within normal limits. Please continue working on lifestyle modifications. This includes accumulation of 150 minutes or more of moderate-intensity activity each week as tolerated and a healthy diet that is low in saturated fat, sodium, and cholesterol, such as the mediterranean diet that is typically high in fruits, vegetables, whole grains, beans, nuts, and seeds, includes olive oil as an important source of monounsaturated fat, DASH diet, and/or also consider a plant-based diet.    2. Encounter for completion of form with patient  See above    Return for As needed.    Maryam Fernandez PA-C ([place])  Physician Assistant Certified  [facility]    Please note that this dictation was created using voice recognition software. I have made every reasonable attempt to correct obvious errors, but I expect that there are errors of grammar and possibly content that I did not discover before finalizing the note.

## 2022-11-08 ENCOUNTER — PATIENT MESSAGE (OUTPATIENT)
Dept: HEALTH INFORMATION MANAGEMENT | Facility: OTHER | Age: 77
End: 2022-11-08

## (undated) DEVICE — CANNULA TWIST IN 8.25MM X 7CM (5/BX)

## (undated) DEVICE — GOWN SURGEONS X-LARGE - DISP. (30/CA)

## (undated) DEVICE — ELECTRODE 850 FOAM ADHESIVE - HYDROGEL RADIOTRNSPRNT (50/PK)

## (undated) DEVICE — SUTURE PLASTIC

## (undated) DEVICE — BLOCK

## (undated) DEVICE — ARTHROWAND TURBOVAC 3.5/90 SCT

## (undated) DEVICE — SODIUM CHL IRRIGATION 0.9% 1000ML (12EA/CA)

## (undated) DEVICE — CLOSURE SKIN STRIP 1/2 X 4 IN - (STERI STRIP) (50/BX 4BX/CA)

## (undated) DEVICE — NEEDLE W/FACET TIP DULL VERSION W/STIMULATION CABLE SONOPLEX 21G X 4 (10/EA)"

## (undated) DEVICE — LACTATED RINGERS INJ 1000 ML - (14EA/CA 60CA/PF)

## (undated) DEVICE — KIT ROOM DECONTAMINATION

## (undated) DEVICE — TUBE CONNECTING SUCTION - CLEAR PLASTIC STERILE 72 IN (50EA/CA)

## (undated) DEVICE — CANISTER SUCTION RIGID RED 1500CC (40EA/CA)

## (undated) DEVICE — BAG, SPONGE COUNT 50600

## (undated) DEVICE — GLOVE BIOGEL OTRHO SZ 7 SURGICAL PF LTX - (4BX/CA)

## (undated) DEVICE — NEPTUNE 4 PORT MANIFOLD - (20/PK)

## (undated) DEVICE — NEEDLE EXPRESSEW III (5EA/PK)

## (undated) DEVICE — SPIDER SHOULDER HOLDER (12EA/BX)

## (undated) DEVICE — TUBE E-T HI-LO CUFF 7.0MM (10EA/PK)

## (undated) DEVICE — GLOVE BIOGEL SZ 8 SURGICAL PF LTX - (50PR/BX 4BX/CA)

## (undated) DEVICE — HUMID-VENT HEAT AND MOISTURE EXCHANGE- (50/BX)

## (undated) DEVICE — SODIUM CHL. IRRIGATION 0.9% 3000ML (4EA/CA 65CA/PF)

## (undated) DEVICE — GLOVE BIOGEL INDICATOR SZ 7SURGICAL PF LTX - (50/BX 4BX/CA)

## (undated) DEVICE — SUCTION INSTRUMENT YANKAUER BULBOUS TIP W/O VENT (50EA/CA)

## (undated) DEVICE — KIT ANESTHESIA W/CIRCUIT & 3/LT BAG W/FILTER (20EA/CA)

## (undated) DEVICE — SHAVER 5.5 RESECTOR FORMULA (5EA/BX )

## (undated) DEVICE — SENSOR SPO2 NEO LNCS ADHESIVE (20/BX) SEE USER NOTES

## (undated) DEVICE — GOWN WARMING STANDARD FLEX - (30/CA)

## (undated) DEVICE — DRAPETIBURON SHOULDER W/POUCH - (5EA/CA)

## (undated) DEVICE — GLOVE, LITE (PAIR)

## (undated) DEVICE — FOAM FACEHOLDER SPIDER (8EA/BX)

## (undated) DEVICE — TUBING PATIENT W/CONNECTOR REDEUCE (1EA)

## (undated) DEVICE — PROTECTOR ULNA NERVE - (36PR/CA)

## (undated) DEVICE — TUBING PUMP WITH CONNECTOR REDEUCE (1EA)

## (undated) DEVICE — SHAVER4.0 AGGRESSIVE + FORMLA (5EA/BX)

## (undated) DEVICE — SUTURE 3-0 PROLENE PS-1 (12PK/BX)

## (undated) DEVICE — GLOVE BIOGEL INDICATOR SZ 8 SURGICAL PF LTX - (50/BX 4BX/CA)

## (undated) DEVICE — PACK SHOULDER ARTHROSCOPY SM - (2EA/CA)

## (undated) DEVICE — GOWN SURGICAL XX-LARGE - (28EA/CA) SIRUS NON REINFORCED

## (undated) DEVICE — MASK ANESTHESIA ADULT  - (100/CA)

## (undated) DEVICE — CHLORAPREP 26 ML APPLICATOR - ORANGE TINT(25/CA)